# Patient Record
(demographics unavailable — no encounter records)

---

## 2025-01-23 NOTE — PHYSICAL EXAM
[No Acute Distress] : no acute distress [Well Nourished] : well nourished [Well Developed] : well developed [Well-Appearing] : well-appearing [Normal Sclera/Conjunctiva] : normal sclera/conjunctiva [PERRL] : pupils equal round and reactive to light [EOMI] : extraocular movements intact [Normal Outer Ear/Nose] : the outer ears and nose were normal in appearance [Normal Oropharynx] : the oropharynx was normal [No JVD] : no jugular venous distention [No Lymphadenopathy] : no lymphadenopathy [Supple] : supple [Thyroid Normal, No Nodules] : the thyroid was normal and there were no nodules present [No Respiratory Distress] : no respiratory distress  [No Accessory Muscle Use] : no accessory muscle use [Clear to Auscultation] : lungs were clear to auscultation bilaterally [Normal Rate] : normal rate  [Regular Rhythm] : with a regular rhythm [Normal S1, S2] : normal S1 and S2 [No Murmur] : no murmur heard [No Carotid Bruits] : no carotid bruits [No Abdominal Bruit] : a ~M bruit was not heard ~T in the abdomen [No Varicosities] : no varicosities [Pedal Pulses Present] : the pedal pulses are present [No Edema] : there was no peripheral edema [No Palpable Aorta] : no palpable aorta [No Extremity Clubbing/Cyanosis] : no extremity clubbing/cyanosis [Soft] : abdomen soft [Non-distended] : non-distended [No Masses] : no abdominal mass palpated [No HSM] : no HSM [Normal Bowel Sounds] : normal bowel sounds [Normal Posterior Cervical Nodes] : no posterior cervical lymphadenopathy [Normal Anterior Cervical Nodes] : no anterior cervical lymphadenopathy [No CVA Tenderness] : no CVA  tenderness [No Spinal Tenderness] : no spinal tenderness [No Joint Swelling] : no joint swelling [Grossly Normal Strength/Tone] : grossly normal strength/tone [No Rash] : no rash [Coordination Grossly Intact] : coordination grossly intact [No Focal Deficits] : no focal deficits [Normal Gait] : normal gait [Deep Tendon Reflexes (DTR)] : deep tendon reflexes were 2+ and symmetric [Normal Affect] : the affect was normal [Normal Insight/Judgement] : insight and judgment were intact [Non Tender] : non-tender [de-identified] : Normotensive  BMI: 30.71  Gained 11 lbs from 1 yrs ago.

## 2025-01-23 NOTE — HISTORY OF PRESENT ILLNESS
[FreeTextEntry1] : Annual Wellness Exam [de-identified] : 21 yrs old male here for annual wellness exam. Senior in college. Film major. Father has shingles currently - patient helps out at home a lot. NKDA PMH: PPD+-false positive test, ADHD, eczema Medication: Ritalin PRN as per behavioral specialist.  Vitamins: none Vaccines: Tdap due today- prior 2014- will check with mother if another booster given. SH: Nonsmoker, no ETOH , no marijuana FH: Mother Thyroid disease.  Father Thyroid disease, HLD, Shingles.  Brother HLD.  Diet: Regular Exercise: Walking, and goes to  gym  Preventive screening:  Urology: Not yet  CRC screening: Not yet  Ophthalmology: every 1-2 yrs - good  Labwork: today  Cardiac: No recent  Need for lung cancer screening/smoker 20 pack yrs/50+/smoker within 15 yrs: Nonsmoker  Dental: every 6 mos  Dermatology: Eczema - every 6 mos  Fall risk: No  Advanced directives: No  Bone density: Not at risk

## 2025-01-23 NOTE — HEALTH RISK ASSESSMENT
[Little interest or pleasure doing things] : 1) Little interest or pleasure doing things [Feeling down, depressed, or hopeless] : 2) Feeling down, depressed, or hopeless [0] : 2) Feeling down, depressed, or hopeless: Not at all (0) [PHQ-2 Negative - No further assessment needed] : PHQ-2 Negative - No further assessment needed [Time Spent: ___ Minutes] : I spent [unfilled] minutes performing a depression screening for this patient. [Never] : Never [[id number]] : 0

## 2025-01-23 NOTE — HEALTH RISK ASSESSMENT
[Little interest or pleasure doing things] : 1) Little interest or pleasure doing things [Feeling down, depressed, or hopeless] : 2) Feeling down, depressed, or hopeless [0] : 2) Feeling down, depressed, or hopeless: Not at all (0) [PHQ-2 Negative - No further assessment needed] : PHQ-2 Negative - No further assessment needed [Time Spent: ___ Minutes] : I spent [unfilled] minutes performing a depression screening for this patient. [Never] : Never [[id number]] : 0

## 2025-01-23 NOTE — PLAN
[FreeTextEntry1] : 21 yrs old male here for annual wellness examination. Patient is a senior in college majoring in film. He sees behavioral specialist and takes Ritalin for ADHD.  Med reconciliation done- No vitamins currently Overall feels well, eats a regular diet, and walks a lot, goes to gym as well. WIll check with his mother regarding need for Tdap- last Tdap on record is 2014. Nonsmoker.  Normotensive  BMI: 30.71  Discussed on cutting down on carbs and fatty foods.  Preventive screening- does regular eye exams and dental exams currenlty. Sees dermatologist as well for eczema Full labwork ordered including CBC CMP, lipid, TFTs LFTs, Hba1c, vitamin levels, urine screen, Mg, CK  RV 1 yrs Patient understands instructions and agrees with plan Will contact patient with results of testing

## 2025-01-23 NOTE — PHYSICAL EXAM
[No Acute Distress] : no acute distress [Well Nourished] : well nourished [Well Developed] : well developed [Well-Appearing] : well-appearing [Normal Sclera/Conjunctiva] : normal sclera/conjunctiva [PERRL] : pupils equal round and reactive to light [EOMI] : extraocular movements intact [Normal Outer Ear/Nose] : the outer ears and nose were normal in appearance [Normal Oropharynx] : the oropharynx was normal [No JVD] : no jugular venous distention [No Lymphadenopathy] : no lymphadenopathy [Supple] : supple [Thyroid Normal, No Nodules] : the thyroid was normal and there were no nodules present [No Respiratory Distress] : no respiratory distress  [No Accessory Muscle Use] : no accessory muscle use [Clear to Auscultation] : lungs were clear to auscultation bilaterally [Normal Rate] : normal rate  [Regular Rhythm] : with a regular rhythm [Normal S1, S2] : normal S1 and S2 [No Murmur] : no murmur heard [No Carotid Bruits] : no carotid bruits [No Abdominal Bruit] : a ~M bruit was not heard ~T in the abdomen [No Varicosities] : no varicosities [Pedal Pulses Present] : the pedal pulses are present [No Edema] : there was no peripheral edema [No Palpable Aorta] : no palpable aorta [No Extremity Clubbing/Cyanosis] : no extremity clubbing/cyanosis [Soft] : abdomen soft [Non-distended] : non-distended [No Masses] : no abdominal mass palpated [No HSM] : no HSM [Normal Bowel Sounds] : normal bowel sounds [Normal Posterior Cervical Nodes] : no posterior cervical lymphadenopathy [Normal Anterior Cervical Nodes] : no anterior cervical lymphadenopathy [No CVA Tenderness] : no CVA  tenderness [No Spinal Tenderness] : no spinal tenderness [No Joint Swelling] : no joint swelling [Grossly Normal Strength/Tone] : grossly normal strength/tone [No Rash] : no rash [Coordination Grossly Intact] : coordination grossly intact [No Focal Deficits] : no focal deficits [Normal Gait] : normal gait [Deep Tendon Reflexes (DTR)] : deep tendon reflexes were 2+ and symmetric [Normal Affect] : the affect was normal [Normal Insight/Judgement] : insight and judgment were intact [Non Tender] : non-tender [de-identified] : Normotensive  BMI: 30.71  Gained 11 lbs from 1 yrs ago.

## 2025-01-23 NOTE — HISTORY OF PRESENT ILLNESS
[FreeTextEntry1] : Annual Wellness Exam [de-identified] : 21 yrs old male here for annual wellness exam. Senior in college. Film major. Father has shingles currently - patient helps out at home a lot. NKDA PMH: PPD+-false positive test, ADHD, eczema Medication: Ritalin PRN as per behavioral specialist.  Vitamins: none Vaccines: Tdap due today- prior 2014- will check with mother if another booster given. SH: Nonsmoker, no ETOH , no marijuana FH: Mother Thyroid disease.  Father Thyroid disease, HLD, Shingles.  Brother HLD.  Diet: Regular Exercise: Walking, and goes to  gym  Preventive screening:  Urology: Not yet  CRC screening: Not yet  Ophthalmology: every 1-2 yrs - good  Labwork: today  Cardiac: No recent  Need for lung cancer screening/smoker 20 pack yrs/50+/smoker within 15 yrs: Nonsmoker  Dental: every 6 mos  Dermatology: Eczema - every 6 mos  Fall risk: No  Advanced directives: No  Bone density: Not at risk